# Patient Record
Sex: FEMALE | URBAN - NONMETROPOLITAN AREA
[De-identification: names, ages, dates, MRNs, and addresses within clinical notes are randomized per-mention and may not be internally consistent; named-entity substitution may affect disease eponyms.]

---

## 2020-06-03 ENCOUNTER — OFFICE VISIT CONVERTED (OUTPATIENT)
Dept: CARDIOLOGY | Facility: CLINIC | Age: 20
End: 2020-06-03
Attending: INTERNAL MEDICINE

## 2020-06-03 ENCOUNTER — CONVERSION ENCOUNTER (OUTPATIENT)
Dept: OTHER | Facility: HOSPITAL | Age: 20
End: 2020-06-03

## 2021-05-10 NOTE — H&P
History and Physical      Patient Name: Bee Ngo   Patient ID: [id number]   Sex: Female   YOB: 2000    Primary Care Provider: Audrey DIEGO   Referring Provider: Audrey DIEGO    Visit Date: Asia 3, 2020    Provider: Kunal Wiggins MD   Location: [facility]   Location Address: [address] [place], [place] [address]an [facility]  [address] [place] [address]  [id number]   Location Phone: [phone]          Chief Complaint  · REASON FOR CONSULTATION: Palpitations       History Of Present Illness  Consult requested by: Audrey DIEGO   Bee Ngo is a 19-year-old female with bronchial asthma, who was referred because of palpitations. Per patient, the symptoms are present for at least 6 months. She was initially noted to have high blood pressure, for which Amlodipine was started. In the next 2 weeks, she started having significantly high heart rate and was noted to have palpitations and fluttering in the chest. Symptoms were mostly at night. On follow-up visit, the medication was changed to Metoprolol. However she did not get any improvement in her symptoms. In fact, she started having palpitations during the daytime as well. She never checked her heart rate at the time of symptoms. She also had some dizziness. She denies any shortness of breath or chest pain. Currently not taking Metoprolol as well. She has no history of any previous cardiac illness.   PAST MEDICAL HISTORY: (1) Bronchial asthma. (2) Reported hypertension.   PSYCHOSOCIAL HISTORY: Denies tobacco use. Drinks alcohol rarely. No recreational drug usage. No mood changes or depression.   FAMILY HISTORY: was reviewed. Negative for premature coronary artery disease.   CURRENT MEDICATIONS: include Advair. The dosage and frequency of the medication was reviewed with the patient.   ALLERGIES: No known drug allergies.       Review of Systems  · Constitutional  o Admits  o : fatigue, recent weight changes   o Denies  o : good general  "health lately  · Eyes  o Denies  o : double vision, blurred vision  · HENT  o Denies  o : hearing loss or ringing, chronic sinus problem, swollen glands in neck  · Cardiovascular  o Admits  o : palpitations (fast, fluttering, or skipping beats), shortness of breath while walking or lying flat  o Denies  o : chest pain, swelling (feet, ankles, hands)  · Respiratory  o Admits  o : asthma or wheezing  o Denies  o : chronic or frequent cough, COPD  · Gastrointestinal  o Denies  o : ulcers, nausea or vomiting  · Neurologic  o Denies  o : lightheaded or dizzy, stroke, headaches  · Musculoskeletal  o Denies  o : joint pain, back pain  · Endocrine  o Denies  o : thyroid disease, diabetes, heat or cold intolerance, excessive thirst or urination  · Heme-Lymph  o Denies  o : bleeding or bruising tendency, anemia      Vitals  Date Time BP Position Site L\R Cuff Size HR RR TEMP (F) WT  HT  BMI kg/m2 BSA m2 O2 Sat HC       06/03/2020 11:19 /88 Sitting    81 - R   250lbs 16oz 5'  3\" 44.46 2.25           Physical Examination  · Constitutional  o Appearance  o : Awake, alert, in no acute distress.  · Head and Face  o HEENT  o : No pallor, anicteric. Eyes normal. Moist mucous membranes.  · Neck  o Inspection/Palpation  o : Supple. No hepatosplenomegaly.  o Jugular Veins  o : No JVD. No carotid bruits.  · Respiratory  o Auscultation of Lungs  o : Clear to auscultation bilaterally. No crackles or wheezing.  · Cardiovascular  o Heart  o : S1, S2 is normally heard. No S3. No murmur, rubs, or gallops.  · Gastrointestinal  o Abdominal Examination  o : Soft, non-distended. No palpable hepatosplenomegaly. Bowel sounds heard in all four quadrants.  · Musculoskeletal  o General  o : Normal muscle tone and strength.  · Skin and Subcutaneous Tissue  o General Inspection  o : No skin rashes.  · Extremities  o Extremities  o : Warm and well perfused. Distal pulses present. No pitting pedal edema.     EKG done at [facility] emergency room on " 03/13/2020 shows normal sinus rhythm, normal axis.  Normal EKG.    Labs done on 03/10/2020 show BUN of 10, creatinine 0.82, sodium 140, potassium 4.4.  Chloride 103, calcium 9.5.  AST 15, ALT 16.  Hemoglobin 12.3, WBC 7.4.           Assessment     ASSESSMENT AND PLAN:    1.  Palpitations:  Recent problem, significant symptoms per patient.  Cardiac arrhythmia needs to be ruled out.       Will proceed with a 24-hour Holter Monitor study.  Will also proceed with an echocardiogram to assess the       left ventricular function and to rule out any significant valvular abnormalities.    2.  Will follow up with echocardiogram and Holter reports.  If patient is noted to have no arrhythmias, calcium       channel blockers may be tried for symptomatic benefits.    MD LINNETTE Lopez/yakov           This note was transcribed by Mercedes Baez.  yakov/[email or web address]  The above service was transcribed by Mercedes Baez, and I attest to the accuracy of the note.  JV               Electronically Signed by: Mercedes Baez-, -Author on Asia 10, 2020 11:49:43 AM  Electronically Co-signed by: Kunal Wiggins MD -Reviewer on June 18, 2020 03:28:19 PM

## 2021-05-10 NOTE — PROCEDURES
Procedure Note      Patient Name: Bee Ngo   Patient ID: [id number]   Sex: Female   YOB: 2000    Primary Care Provider: Audrey DIEGO   Referring Provider: Audrey DIEGO    Visit Date: [place] 3, 2020    Provider: Kunal Wiggins MD   Location: [facility]   Location Address: [address] [place] [address]  [address] [place] [address]  [id number]   Location Phone: [phone]          FINAL REPORT   HOLTER MONITOR REPORT  Date: 06/03/2020   Indication: Palpitations      The patient was monitored for a period of 24 hours  starting from 11:12 on 06/03/2020.  The underlying rhythm was normal sinus rhythm with heart rates varying from 62 beats per minute to 150 beats per minute, average being 86 beats per minute.  The fastest heart rate of 150 beats per minute was documented at 15:23 in sinus tachycardia.  2.4% of the time was spent in heart rate of more than 120 beats per minute. Rare (59 in total) isolated ventricular ectopic beats noted.  Rare (17 in total) supraventricular ectopic beats noted.  There were no significant pauses.  The patient did not report any symptoms during the study period.    CONCLUSION:  24-hour Holter monitor study showing episodes of sinus tachycardia.  No arrhythmias detected.        MD LINNETTE Lopez/lyudmila    This note was transcribed by Jelly Flor.  [email or web address]/[email or web address]  The above service was transcribed by Jelly Flor, and I attest to the accuracy of the note.  LINNETTE                 Electronically Signed by: Viktoria Flor-, Other -Author on June 23, 2020 10:18:27 AM  Electronically Co-signed by: Kunal Wiggins MD -Reviewer on June 23, 2020 11:50:31 AM

## 2021-05-15 VITALS
BODY MASS INDEX: 44.47 KG/M2 | HEIGHT: 63 IN | WEIGHT: 251 LBS | HEART RATE: 81 BPM | DIASTOLIC BLOOD PRESSURE: 88 MMHG | SYSTOLIC BLOOD PRESSURE: 133 MMHG

## 2025-03-13 ENCOUNTER — TELEPHONE (OUTPATIENT)
Age: 25
End: 2025-03-13

## 2025-03-13 NOTE — TELEPHONE ENCOUNTER
Caller: Bee Ngo    Relationship:  Self    Best call back number: .[phone]      PATIENT CALLED REQUESTING TO CANCEL SAME DAY APPT.    Did the patient call AFTER the start time of their scheduled appointment?  []YES  [x]NO    Was the patient's appointment rescheduled? [x]YES  []NO